# Patient Record
Sex: FEMALE | URBAN - METROPOLITAN AREA
[De-identification: names, ages, dates, MRNs, and addresses within clinical notes are randomized per-mention and may not be internally consistent; named-entity substitution may affect disease eponyms.]

---

## 2022-11-22 ENCOUNTER — APPOINTMENT (OUTPATIENT)
Dept: URBAN - METROPOLITAN AREA CLINIC 243 | Age: 32
Setting detail: DERMATOLOGY
End: 2022-11-22

## 2022-11-22 DIAGNOSIS — D22 MELANOCYTIC NEVI: ICD-10-CM

## 2022-11-22 DIAGNOSIS — Z12.83 ENCOUNTER FOR SCREENING FOR MALIGNANT NEOPLASM OF SKIN: ICD-10-CM

## 2022-11-22 DIAGNOSIS — D49.2 NEOPLASM OF UNSPECIFIED BEHAVIOR OF BONE, SOFT TISSUE, AND SKIN: ICD-10-CM

## 2022-11-22 PROBLEM — D22.5 MELANOCYTIC NEVI OF TRUNK: Status: ACTIVE | Noted: 2022-11-22

## 2022-11-22 PROCEDURE — 99212 OFFICE O/P EST SF 10 MIN: CPT | Mod: 25

## 2022-11-22 PROCEDURE — OTHER COUNSELING: OTHER

## 2022-11-22 PROCEDURE — OTHER MIPS QUALITY: OTHER

## 2022-11-22 PROCEDURE — 11300 SHAVE SKIN LESION 0.5 CM/<: CPT

## 2022-11-22 PROCEDURE — OTHER SHAVE REMOVAL: OTHER

## 2022-11-22 PROCEDURE — A4550 SURGICAL TRAYS: HCPCS

## 2022-11-22 ASSESSMENT — LOCATION ZONE DERM
LOCATION ZONE: TRUNK
LOCATION ZONE: LEG

## 2022-11-22 ASSESSMENT — LOCATION DETAILED DESCRIPTION DERM
LOCATION DETAILED: RIGHT SUPERIOR MEDIAL UPPER BACK
LOCATION DETAILED: LEFT ANTERIOR PROXIMAL THIGH
LOCATION DETAILED: RIGHT INFERIOR UPPER BACK

## 2022-11-22 ASSESSMENT — LOCATION SIMPLE DESCRIPTION DERM
LOCATION SIMPLE: RIGHT UPPER BACK
LOCATION SIMPLE: LEFT THIGH

## 2022-11-22 NOTE — PROCEDURE: SHAVE REMOVAL
Detail Level: Detailed
Size Of Lesion In Cm (Required): 0.4
Render Path Notes In Note?: No
Render Post-Care Instructions In Note?: yes
Path Notes (To The Dermatopathologist): Check Margins
Hemostasis: Electrocautery
Medical Necessity Information: It is in your best interest to select a reason for this procedure from the list below. All of these items fulfill various CMS LCD requirements except the new and changing color options.
Notification Instructions: Patient will be notified of pathology results. However, patient instructed to call the office if not contacted within 2 weeks.
Anesthesia Type: 1% lidocaine with epinephrine
Medical Necessity Clause: This procedure was medically necessary because the lesion that was treated was:
Consent was obtained from the patient. The risks and benefits to therapy were discussed in detail. Specifically, the risks of infection, scarring, bleeding, prolonged wound healing, incomplete removal, allergy to anesthesia, nerve injury and recurrence were addressed. Prior to the procedure, the treatment site was clearly identified and confirmed by the patient. All components of Universal Protocol/PAUSE Rule completed.
Billing Type: Third-Party Bill
Biopsy Method: Personna blade
Post-Care Instructions: I reviewed with the patient in detail post-care instructions. Patient is to keep the biopsy site dry overnight, and then apply vaseline twice daily until healed.
Anesthesia Volume In Cc: 2
Wound Care: Petrolatum
X Size Of Lesion In Cm (Optional): 0

## 2023-10-16 ENCOUNTER — APPOINTMENT (OUTPATIENT)
Dept: URBAN - METROPOLITAN AREA CLINIC 243 | Age: 33
Setting detail: DERMATOLOGY
End: 2023-10-16

## 2023-10-16 DIAGNOSIS — Z12.83 ENCOUNTER FOR SCREENING FOR MALIGNANT NEOPLASM OF SKIN: ICD-10-CM

## 2023-10-16 DIAGNOSIS — Z71.89 OTHER SPECIFIED COUNSELING: ICD-10-CM

## 2023-10-16 DIAGNOSIS — D22 MELANOCYTIC NEVI: ICD-10-CM

## 2023-10-16 PROBLEM — D22.5 MELANOCYTIC NEVI OF TRUNK: Status: ACTIVE | Noted: 2023-10-16

## 2023-10-16 PROCEDURE — OTHER MIPS QUALITY: OTHER

## 2023-10-16 PROCEDURE — OTHER REASSURANCE: OTHER

## 2023-10-16 PROCEDURE — 99212 OFFICE O/P EST SF 10 MIN: CPT

## 2023-10-16 PROCEDURE — OTHER COUNSELING: OTHER

## 2023-10-16 ASSESSMENT — LOCATION DETAILED DESCRIPTION DERM
LOCATION DETAILED: RIGHT INFERIOR MEDIAL LOWER BACK
LOCATION DETAILED: RIGHT SUPERIOR MEDIAL LOWER BACK
LOCATION DETAILED: RIGHT INFERIOR MEDIAL UPPER BACK

## 2023-10-16 ASSESSMENT — LOCATION SIMPLE DESCRIPTION DERM
LOCATION SIMPLE: RIGHT UPPER BACK
LOCATION SIMPLE: RIGHT LOWER BACK

## 2023-10-16 ASSESSMENT — LOCATION ZONE DERM: LOCATION ZONE: TRUNK

## 2025-06-10 ENCOUNTER — APPOINTMENT (OUTPATIENT)
Dept: URBAN - METROPOLITAN AREA CLINIC 243 | Age: 35
Setting detail: DERMATOLOGY
End: 2025-06-11

## 2025-06-10 DIAGNOSIS — L81.4 OTHER MELANIN HYPERPIGMENTATION: ICD-10-CM

## 2025-06-10 DIAGNOSIS — L55.9 SUNBURN, UNSPECIFIED: ICD-10-CM

## 2025-06-10 DIAGNOSIS — Z71.89 OTHER SPECIFIED COUNSELING: ICD-10-CM

## 2025-06-10 DIAGNOSIS — L30.9 DERMATITIS, UNSPECIFIED: ICD-10-CM

## 2025-06-10 DIAGNOSIS — L82.1 OTHER SEBORRHEIC KERATOSIS: ICD-10-CM

## 2025-06-10 DIAGNOSIS — Z12.83 ENCOUNTER FOR SCREENING FOR MALIGNANT NEOPLASM OF SKIN: ICD-10-CM

## 2025-06-10 DIAGNOSIS — D22 MELANOCYTIC NEVI: ICD-10-CM

## 2025-06-10 PROBLEM — D22.5 MELANOCYTIC NEVI OF TRUNK: Status: ACTIVE | Noted: 2025-06-10

## 2025-06-10 PROCEDURE — OTHER COUNSELING: OTHER

## 2025-06-10 PROCEDURE — 99213 OFFICE O/P EST LOW 20 MIN: CPT

## 2025-06-10 PROCEDURE — OTHER TREATMENT REGIMEN: OTHER

## 2025-06-10 PROCEDURE — OTHER PRESCRIPTION: OTHER

## 2025-06-10 PROCEDURE — OTHER MIPS QUALITY: OTHER

## 2025-06-10 PROCEDURE — OTHER REASSURANCE: OTHER

## 2025-06-10 RX ORDER — HYDROCORTISONE 25 MG/G
CREAM TOPICAL
Qty: 30 | Refills: 1 | Status: ERX | COMMUNITY
Start: 2025-06-10

## 2025-06-10 ASSESSMENT — LOCATION ZONE DERM
LOCATION ZONE: TRUNK
LOCATION ZONE: FACE
LOCATION ZONE: NECK

## 2025-06-10 ASSESSMENT — LOCATION SIMPLE DESCRIPTION DERM
LOCATION SIMPLE: RIGHT UPPER BACK
LOCATION SIMPLE: RIGHT LOWER BACK
LOCATION SIMPLE: UPPER BACK
LOCATION SIMPLE: TRAPEZIAL NECK
LOCATION SIMPLE: LEFT CHEEK

## 2025-06-10 ASSESSMENT — LOCATION DETAILED DESCRIPTION DERM
LOCATION DETAILED: INFERIOR THORACIC SPINE
LOCATION DETAILED: MID TRAPEZIAL NECK
LOCATION DETAILED: RIGHT INFERIOR MEDIAL UPPER BACK
LOCATION DETAILED: RIGHT INFERIOR MEDIAL MIDBACK
LOCATION DETAILED: RIGHT INFERIOR MEDIAL LOWER BACK
LOCATION DETAILED: LEFT INFERIOR CENTRAL MALAR CHEEK
LOCATION DETAILED: RIGHT SUPERIOR MEDIAL LOWER BACK